# Patient Record
Sex: FEMALE | URBAN - METROPOLITAN AREA
[De-identification: names, ages, dates, MRNs, and addresses within clinical notes are randomized per-mention and may not be internally consistent; named-entity substitution may affect disease eponyms.]

---

## 2017-06-01 ENCOUNTER — OFFICE VISIT (OUTPATIENT)
Dept: OBGYN | Facility: CLINIC | Age: 42
End: 2017-06-01
Payer: COMMERCIAL

## 2017-06-01 ENCOUNTER — RADIANT APPOINTMENT (OUTPATIENT)
Dept: MAMMOGRAPHY | Facility: CLINIC | Age: 42
End: 2017-06-01
Payer: COMMERCIAL

## 2017-06-01 VITALS
HEIGHT: 66 IN | SYSTOLIC BLOOD PRESSURE: 112 MMHG | DIASTOLIC BLOOD PRESSURE: 84 MMHG | BODY MASS INDEX: 32.3 KG/M2 | WEIGHT: 201 LBS

## 2017-06-01 DIAGNOSIS — Z12.31 VISIT FOR SCREENING MAMMOGRAM: ICD-10-CM

## 2017-06-01 DIAGNOSIS — Z01.419 ENCOUNTER FOR GYNECOLOGICAL EXAMINATION WITHOUT ABNORMAL FINDING: Primary | ICD-10-CM

## 2017-06-01 DIAGNOSIS — Z30.41 ENCOUNTER FOR SURVEILLANCE OF CONTRACEPTIVE PILLS: ICD-10-CM

## 2017-06-01 DIAGNOSIS — E03.9 ACQUIRED HYPOTHYROIDISM: ICD-10-CM

## 2017-06-01 LAB — TSH SERPL DL<=0.005 MIU/L-ACNC: 3.96 MU/L (ref 0.4–4)

## 2017-06-01 PROCEDURE — G0202 SCR MAMMO BI INCL CAD: HCPCS | Mod: TC

## 2017-06-01 PROCEDURE — 84443 ASSAY THYROID STIM HORMONE: CPT | Performed by: NURSE PRACTITIONER

## 2017-06-01 PROCEDURE — 99396 PREV VISIT EST AGE 40-64: CPT | Performed by: NURSE PRACTITIONER

## 2017-06-01 PROCEDURE — 36415 COLL VENOUS BLD VENIPUNCTURE: CPT | Performed by: NURSE PRACTITIONER

## 2017-06-01 PROCEDURE — G0145 SCR C/V CYTO,THINLAYER,RESCR: HCPCS | Performed by: NURSE PRACTITIONER

## 2017-06-01 RX ORDER — LEVONORGESTREL AND ETHINYL ESTRADIOL 0.15-0.03
1 KIT ORAL DAILY
Qty: 112 TABLET | Refills: 3 | Status: SHIPPED | OUTPATIENT
Start: 2017-06-01 | End: 2018-07-23

## 2017-06-01 ASSESSMENT — ANXIETY QUESTIONNAIRES
3. WORRYING TOO MUCH ABOUT DIFFERENT THINGS: NOT AT ALL
6. BECOMING EASILY ANNOYED OR IRRITABLE: NOT AT ALL
IF YOU CHECKED OFF ANY PROBLEMS ON THIS QUESTIONNAIRE, HOW DIFFICULT HAVE THESE PROBLEMS MADE IT FOR YOU TO DO YOUR WORK, TAKE CARE OF THINGS AT HOME, OR GET ALONG WITH OTHER PEOPLE: NOT DIFFICULT AT ALL
2. NOT BEING ABLE TO STOP OR CONTROL WORRYING: NOT AT ALL
1. FEELING NERVOUS, ANXIOUS, OR ON EDGE: NOT AT ALL
GAD7 TOTAL SCORE: 0
7. FEELING AFRAID AS IF SOMETHING AWFUL MIGHT HAPPEN: NOT AT ALL
5. BEING SO RESTLESS THAT IT IS HARD TO SIT STILL: NOT AT ALL

## 2017-06-01 ASSESSMENT — PATIENT HEALTH QUESTIONNAIRE - PHQ9: 5. POOR APPETITE OR OVEREATING: NOT AT ALL

## 2017-06-01 NOTE — PROGRESS NOTES
Marily is a 41 year old  female who presents for annual exam.     Besides routine health maintenance,  she would like to discuss bladder issues.    HPI:  The patient has no PCP on file. Patient is here today for her annual exam. She has some urine loss when riding her horses. She recently started [employer] and is down 5lbs. She is happy with this and seems to be loosing more inches at this time. She is also working on her diet. She is still on OCP's continuously, no spotting. She has her mammogram today.      GYNECOLOGIC HISTORY:    Patient's last menstrual period was 2017 (approximate).  Her current contraception method is: oral contraceptives.  She  reports that she quit smoking about 6 years ago. She has never used smokeless tobacco.    Patient is not sexually active.  STD testing offered?  Declined  Last PHQ-9 score on record =   PHQ-9 SCORE 2017   Total Score 5     Last GAD7 score on record =   TORRES-7 SCORE 2017   Total Score 0     Alcohol Score = 1    HEALTH MAINTENANCE:  Cholesterol: 16  Cholesterol   Date Value Ref Range Status   2016 223 (H) <200 mg/dL Final     Comment:     Desirable:       <200 mg/dl     Last Mammo: 14, Result: normal, Next Mammo: today   Pap: 16  Lab Results   Component Value Date    PAP NIL 2016    PAP NIL 2015    PAP OTHER-NIL, See Result 2015     Colonoscopy:  N/A, Result: not applicable, Next Colonoscopy: N/A  Dexa:  N/A    Health maintenance updated:  yes    HISTORY:  Obstetric History       T1      L1     SAB0   TAB0   Ectopic0   Multiple0   Live Births0       # Outcome Date GA Lbr Matthew/2nd Weight Sex Delivery Anes PTL Lv   3 AB 99           2 Term 10/01/97    F    DANK   1 AB 95                  Patient Active Problem List   Diagnosis     Hypothyroidism     Past Surgical History:   Procedure Laterality Date     ANKLE SURGERY Right ,       Social History   Substance Use Topics      "Smoking status: Former Smoker     Quit date: 5/21/2011     Smokeless tobacco: Never Used     Alcohol use 0.0 oz/week     0 Standard drinks or equivalent per week      Comment: occ      Problem (# of Occurrences) Relation (Name,Age of Onset)    Breast Cancer (1) Paternal Grandmother    Thyroid Disease (3) Mother, Father, Sister            Current Outpatient Prescriptions   Medication Sig     levonorgestrel-ethinyl estradiol (SEASONALE) 0.15-0.03 MG per tablet Take 1 tablet by mouth daily     levothyroxine (SYNTHROID, LEVOTHROID) 112 MCG tablet Take 1 tablet (112 mcg) by mouth daily     buPROPion (WELLBUTRIN XL) 150 MG 24 hr tablet Take 1 tablet (150 mg) by mouth every morning     [DISCONTINUED] levonorgestrel-ethinyl estradiol (SEASONALE) 0.15-0.03 MG per tablet Take 1 tablet by mouth daily     No current facility-administered medications for this visit.      Allergies   Allergen Reactions     Sulfa Drugs Rash       Past medical, surgical, social and family histories were reviewed and updated in EPIC.    ROS:   12 point review of systems negative other than symptoms noted below.  Psychiatric: Difficulty Sleeping    EXAM:  /84  Ht 5' 6\" (1.676 m)  Wt 201 lb (91.2 kg)  LMP 05/24/2017 (Approximate)  Breastfeeding? No  BMI 32.44 kg/m2   BMI: Body mass index is 32.44 kg/(m^2).    PHYSICAL EXAM:  Constitutional:  Appearance: Well nourished, well developed, alert, in no acute distress  Neck:  Lymph Nodes:  No lymphadenopathy present    Thyroid:  Gland size normal, nontender, no nodules or masses present  on palpation  Chest:  Respiratory Effort:  Breathing unlabored  Cardiovascular:    Heart: Auscultation:  Regular rate, normal rhythm, no murmurs present  Breasts: Inspection of Breasts:  No lymphadenopathy present    Palpation of Breasts and Axillae:  No masses present on palpation, no  breast tenderness    Axillary Lymph Nodes:  No lymphadenopathy present  Gastrointestinal:   Abdominal Examination:  Abdomen " nontender to palpation, tone normal without rigidity or guarding, no masses present, umbilicus without lesions   Liver and Spleen:  No hepatomegaly present, liver nontender to palpation    Hernias:  No hernias present  Lymphatic: Lymph Nodes:  No other lymphadenopathy present  Skin:  General Inspection:  No rashes present, no lesions present, no areas of  discoloration    Genitalia and Groin:  No rashes present, no lesions present, no areas of  discoloration, no masses present  Neurologic/Psychiatric:    Mental Status:  Oriented X3     Pelvic Exam:  External Genitalia:     Normal appearance for age, no discharge present, no tenderness present, no inflammatory lesions present, color normal  Vagina:     Normal vaginal vault without central or paravaginal defects, no discharge present, no inflammatory lesions present, no masses present  Bladder:     Nontender to palpation  Urethra:   Urethral Body:  Urethra palpation normal, urethra structural support normal   Urethral Meatus:  No erythema or lesions present  Cervix:     Appearance healthy, no lesions present, nontender to palpation, no bleeding present  Uterus:     Uterus: firm, normal sized and nontender, anteverted in position.   Adnexa:     No adnexal tenderness present, no adnexal masses present  Perineum:     Perineum within normal limits, no evidence of trauma, no rashes or skin lesions present  Anus:     Anus within normal limits, no hemorrhoids present  Inguinal Lymph Nodes:     No lymphadenopathy present  Pubic Hair:     Normal pubic hair distribution for age  Genitalia and Groin:     No rashes present, no lesions present, no areas of discoloration, no masses present    COUNSELING:   Special attention given to:        Regular exercise       Healthy diet/nutrition       Contraception    BMI: Body mass index is 32.44 kg/(m^2).  Weight management plan: Discussed healthy diet and exercise guidelines and patient will follow up in 12 months in clinic to  re-evaluate.    ASSESSMENT:  41 year old female with satisfactory annual exam.    ICD-10-CM    1. Encounter for gynecological examination without abnormal finding Z01.419 Pap imaged thin layer screen reflex to HPV if ASCUS - recommended age 25 - 29 years   2. Encounter for surveillance of contraceptive pills Z30.41 levonorgestrel-ethinyl estradiol (SEASONALE) 0.15-0.03 MG per tablet   3. Acquired hypothyroidism E03.9 TSH with free T4 reflex       PLAN:  Annual pap, ok for OCP x1 year, will check thyroid today again. Encouraged weight loss for bladder, kegals.    YEYO Palm CNP

## 2017-06-01 NOTE — LETTER
June 7, 2017      Marily Beck  [address] [address]    Dear ,      I am happy to inform you that your recent cervical cancer screening test (PAP smear) was normal.      Preventative screenings such as this help to ensure your health for years to come. You should repeat a pap smear in 1 year, unless otherwise directed.      You will still need to return to the clinic every year for your annual exam and other preventive tests.     Please contact the clinic at [phone] if you have further questions.       Sincerely,      Svetlana Cochran, APRN CNP/rlm

## 2017-06-01 NOTE — MR AVS SNAPSHOT
"              After Visit Summary   6/1/2017    Marily Beck    MRN: [id number]           Patient Information     Date Of Birth          1975        Visit Information        Provider Department      6/1/2017 11:00 AM Svetlana Cochran APRN CNP [facility]        Today's Diagnoses     Encounter for gynecological examination without abnormal finding    -  1    Encounter for surveillance of contraceptive pills        Acquired hypothyroidism           Follow-ups after your visit        Follow-up notes from your care team     Return in about 1 year (around 6/1/2018).      Who to contact     If you have questions or need follow up information about today's clinic visit or your schedule please contact [facility] directly at [phone].  Normal or non-critical lab and imaging results will be communicated to you by UXFLIPhart, letter or phone within 4 business days after the clinic has received the results. If you do not hear from us within 7 days, please contact the clinic through UXFLIPhart or phone. If you have a critical or abnormal lab result, we will notify you by phone as soon as possible.  Submit refill requests through Deemelo or call your pharmacy and they will forward the refill request to us. Please allow 3 business days for your refill to be completed.          Additional Information About Your Visit        UXFLIPhart Information     Deemelo lets you send messages to your doctor, view your test results, renew your prescriptions, schedule appointments and more. To sign up, go to [email or web address] . Click on \"Log in\" on the left side of the screen, which will take you to the Welcome page. Then click on \"Sign up Now\" on the right side of the page.     You will be asked to enter the access code listed below, as well as some personal information. Please follow the directions to create your username and password.     Your access code is: [email or web address]  Expires: " "2017 11:19 AM     Your access code will  in 90 days. If you need help or a new code, please call your [facility] clinic or [phone].        Care EveryWhere ID     This is your Care EveryWhere ID. This could be used by other organizations to access your [facility] medical records  [id number]        Your Vitals Were     Height Last Period Breastfeeding? BMI (Body Mass Index)          5' 6\" (1.676 m) 2017 (Approximate) No 32.44 kg/m2         Blood Pressure from Last 3 Encounters:   17 112/84   16 110/70   06/01/15 137/89    Weight from Last 3 Encounters:   17 201 lb (91.2 kg)   16 206 lb (93.4 kg)   06/01/15 205 lb (93 kg)              We Performed the Following     Pap imaged thin layer screen reflex to HPV if ASCUS - recommended age 25 - 29 years     TSH with free T4 reflex          Where to get your medicines      These medications were sent to [place] [address] [place] [address] [address]     Phone:  [phone]     levonorgestrel-ethinyl estradiol 0.15-0.03 MG per tablet          Primary Care Provider    None Specified       No primary provider on file.        Thank you!     Thank you for choosing [facility]  for your care. Our goal is always to provide you with excellent care. Hearing back from our patients is one way we can continue to improve our services. Please take a few minutes to complete the written survey that you may receive in the mail after your visit with us. Thank you!             Your Updated Medication List - Protect others around you: Learn how to safely use, store and throw away your medicines at www.disposemymeds.org.          This list is accurate as of: 17 12:21 PM.  Always use your most recent med list.                   Brand Name Dispense Instructions for use    buPROPion 150 MG 24 hr tablet    WELLBUTRIN XL    90 tablet    Take 1 tablet (150 mg) by mouth every morning       " levonorgestrel-ethinyl estradiol 0.15-0.03 MG per tablet    SEASONALE    112 tablet    Take 1 tablet by mouth daily       levothyroxine 112 MCG tablet    SYNTHROID/LEVOTHROID    90 tablet    Take 1 tablet (112 mcg) by mouth daily

## 2017-06-01 NOTE — LETTER
[facility]  [address] , [address]  [place]   [address]  [phone]        6/6/2017     Marily Beck  [address] [address]        Marily Beck your lab results came back normal.       Results for orders placed or performed in visit on 06/01/17   TSH with free T4 reflex   Result Value Ref Range    TSH 3.96 0.40 - 4.00 mU/L       Your thyroid test was normal. You can continue your current dose of levothyroxine.    Cordially,    YEYO Palm CNP

## 2017-06-02 ASSESSMENT — PATIENT HEALTH QUESTIONNAIRE - PHQ9: SUM OF ALL RESPONSES TO PHQ QUESTIONS 1-9: 5

## 2017-06-02 ASSESSMENT — ANXIETY QUESTIONNAIRES: GAD7 TOTAL SCORE: 0

## 2017-06-05 LAB
COPATH REPORT: NORMAL
PAP: NORMAL

## 2017-06-06 RX ORDER — LEVOTHYROXINE SODIUM 112 UG/1
112 TABLET ORAL DAILY
Qty: 90 TABLET | Refills: 3 | Status: SHIPPED | OUTPATIENT
Start: 2017-06-06 | End: 2018-07-16

## 2017-10-12 DIAGNOSIS — F32.A DEPRESSION, UNSPECIFIED DEPRESSION TYPE: ICD-10-CM

## 2017-10-12 RX ORDER — BUPROPION HYDROCHLORIDE 150 MG/1
150 TABLET ORAL EVERY MORNING
Qty: 30 TABLET | Refills: 0 | Status: SHIPPED | OUTPATIENT
Start: 2017-10-12 | End: 2017-12-19

## 2017-10-12 NOTE — TELEPHONE ENCOUNTER
Pending Prescriptions:                       Disp   Refills    buPROPion (WELLBUTRIN XL) 150 MG 24 hr ta*90 tab*3            Sig: Take 1 tablet (150 mg) by mouth every morning         Last Written Prescription Date: 9/20/16  Last Fill Quantity: 90; # refills: 3  Last Office Visit with G, P or [facility] prescribing provider:  6/1/17  Future Visit: None        Last PHQ-9 score on record=   PHQ-9 SCORE 6/1/2017   Total Score 5       No results found for: AST  No results found for: ALT

## 2017-12-19 DIAGNOSIS — F32.A DEPRESSION, UNSPECIFIED DEPRESSION TYPE: ICD-10-CM

## 2017-12-19 NOTE — TELEPHONE ENCOUNTER
Bupropion HCL       Last Written Prescription Date:  10/12/17  Last Fill Quantity: 30,   # refills: 0  Last Office Visit: 6/1/17  Future Office visit:   NONE    Routing refill request to provider for review/approval because:  Rx not filled at last annnual. Routing to Svetlana perez for refill?

## 2017-12-20 RX ORDER — BUPROPION HYDROCHLORIDE 150 MG/1
150 TABLET ORAL EVERY MORNING
Qty: 90 TABLET | Refills: 1 | Status: SHIPPED | OUTPATIENT
Start: 2017-12-20

## 2018-07-16 DIAGNOSIS — E03.9 ACQUIRED HYPOTHYROIDISM: ICD-10-CM

## 2018-07-16 NOTE — TELEPHONE ENCOUNTER
"Requested Prescriptions   Pending Prescriptions Disp Refills     levothyroxine (SYNTHROID/LEVOTHROID) 112 MCG tablet 90 tablet 3     Sig: Take 1 tablet (112 mcg) by mouth daily    Thyroid Protocol Failed    7/16/2018  1:28 PM       Failed - Recent (12 mo) or future (30 days) visit within the authorizing provider's specialty    Patient had office visit in the last 12 months or has a visit in the next 30 days with authorizing provider or within the authorizing provider's specialty.  See \"Patient Info\" tab in inbasket, or \"Choose Columns\" in Meds & Orders section of the refill encounter.           Failed - Normal TSH on file in past 12 months    Recent Labs   Lab Test  06/01/17   1111   TSH  3.96             Passed - Patient is 12 years or older       Passed - No active pregnancy on record    If patient is pregnant or has had a positive pregnancy test, please check TSH.         Passed - No positive pregnancy test in past 12 months    If patient is pregnant or has had a positive pregnancy test, please check TSH.          Last Written Prescription Date:  6/6/17  Last Fill Quantity: 90,  # refills: 3   Last office visit: 6/1/2017 with prescribing provider:  DOMINIQUE   Future Office Visit:      "

## 2018-07-18 NOTE — TELEPHONE ENCOUNTER
Unable to reach patient or leave message as no options available.   Will attempt at later time.     Patsy Perez RN

## 2018-07-23 DIAGNOSIS — Z30.41 ENCOUNTER FOR SURVEILLANCE OF CONTRACEPTIVE PILLS: ICD-10-CM

## 2018-07-23 NOTE — TELEPHONE ENCOUNTER
Last Written Prescription Date:  6/1/17  Last Fill Quantity: 112,  # refills: 3   Last office visit: 6/1/2017 with prescribing provider:  Svetlana Cochran   Future Office Visit:  none

## 2018-07-25 RX ORDER — LEVONORGESTREL AND ETHINYL ESTRADIOL 0.15-0.03
1 KIT ORAL DAILY
Qty: 28 TABLET | Refills: 0 | Status: SHIPPED | OUTPATIENT
Start: 2018-07-25

## 2018-07-26 RX ORDER — LEVOTHYROXINE SODIUM 112 UG/1
112 TABLET ORAL DAILY
Qty: 30 TABLET | Refills: 0 | Status: SHIPPED | OUTPATIENT
Start: 2018-07-26 | End: 2018-08-06

## 2018-10-03 ENCOUNTER — RADIANT APPOINTMENT (OUTPATIENT)
Dept: GENERAL RADIOLOGY | Facility: CLINIC | Age: 43
End: 2018-10-03
Attending: NURSE PRACTITIONER
Payer: COMMERCIAL

## 2018-10-03 ENCOUNTER — OFFICE VISIT (OUTPATIENT)
Dept: URGENT CARE | Facility: URGENT CARE | Age: 43
End: 2018-10-03
Payer: COMMERCIAL

## 2018-10-03 VITALS
TEMPERATURE: 98.1 F | BODY MASS INDEX: 34.9 KG/M2 | SYSTOLIC BLOOD PRESSURE: 129 MMHG | OXYGEN SATURATION: 96 % | HEART RATE: 97 BPM | WEIGHT: 216.2 LBS | DIASTOLIC BLOOD PRESSURE: 87 MMHG

## 2018-10-03 DIAGNOSIS — S99.921A FOOT INJURY, RIGHT, INITIAL ENCOUNTER: Primary | ICD-10-CM

## 2018-10-03 DIAGNOSIS — S90.31XA CONTUSION OF RIGHT FOOT, INITIAL ENCOUNTER: ICD-10-CM

## 2018-10-03 PROCEDURE — 73630 X-RAY EXAM OF FOOT: CPT | Mod: RT

## 2018-10-03 PROCEDURE — 99213 OFFICE O/P EST LOW 20 MIN: CPT | Performed by: NURSE PRACTITIONER

## 2018-10-03 RX ORDER — IBUPROFEN 600 MG/1
600 TABLET, FILM COATED ORAL EVERY 6 HOURS PRN
Qty: 30 TABLET | Refills: 0 | Status: SHIPPED | OUTPATIENT
Start: 2018-10-03

## 2018-10-03 ASSESSMENT — ENCOUNTER SYMPTOMS
CHILLS: 0
HEADACHES: 0
SORE THROAT: 0
RHINORRHEA: 0
NAUSEA: 0
COUGH: 0
FEVER: 0
DIARRHEA: 0
VOMITING: 0
SHORTNESS OF BREATH: 0

## 2018-10-03 NOTE — PROGRESS NOTES
SUBJECTIVE:   Marily Beck is a 43 year old female presenting with a chief complaint of   Chief Complaint   Patient presents with     Foot Injury     Patient injured right foot        She is an established patient of [facility].    MS Injury/Pain    Onset of symptoms was 2 day(s) ago.  Location: right foot  Context:       The injury happened while at home      Mechanism: stepped on foot by a horse      Patient experienced immediate pain, immediate swelling, was able to bear weight directly after injury  Course of symptoms is worsening.    Severity moderate  Current and Associated symptoms: Pain, Swelling and Decreased range of motion  Denies  Redness and Stiffness  Aggravating Factors: walking, weight-bearing, repetitive motion and flexion/extension  Therapies to improve symptoms include: ice  This is the first time this type of problem has occurred for this patient.       Review of Systems   Constitutional: Negative for chills and fever.   HENT: Negative for congestion, ear pain, rhinorrhea and sore throat.    Respiratory: Negative for cough and shortness of breath.    Gastrointestinal: Negative for diarrhea, nausea and vomiting.   Musculoskeletal:        Right foot injury   Neurological: Negative for headaches.   All other systems reviewed and are negative.      Past Medical History:   Diagnosis Date     ASCUS (atypical squamous cells of undetermined significance) on gynecologic Papanicolaou smear complicating pregnancy, antepartum 2001     Atypical glandular cells of undetermined significance (ELTON) on cervical Pap smear 2002     Hypothyroid      Mixed urge and stress incontinence 2013     Family History   Problem Relation Age of Onset     Thyroid Disease Mother      Thyroid Disease Father      Breast Cancer Paternal Grandmother      Thyroid Disease Sister      Current Outpatient Prescriptions   Medication Sig Dispense Refill     buPROPion (WELLBUTRIN XL) 150 MG 24 hr tablet Take 1 tablet (150 mg) by mouth  every morning 90 tablet 1     ibuprofen (ADVIL/MOTRIN) 600 MG tablet Take 1 tablet (600 mg) by mouth every 6 hours as needed for moderate pain 30 tablet 0     levonorgestrel-ethinyl estradiol (SEASONALE) 0.15-0.03 MG per tablet Take 1 tablet by mouth daily 28 tablet 0     levothyroxine (SYNTHROID/LEVOTHROID) 112 MCG tablet TAKE 1 TABLET BY MOUTH EVERY DAY 10 tablet 0     Social History   Substance Use Topics     Smoking status: Former Smoker     Quit date: 5/21/2011     Smokeless tobacco: Never Used     Alcohol use 0.0 oz/week     0 Standard drinks or equivalent per week      Comment: occ       OBJECTIVE  /87 (BP Location: Left arm, Patient Position: Chair, Cuff Size: Adult Large)  Pulse 97  Temp 98.1  F (36.7  C) (Oral)  Wt 216 lb 3.2 oz (98.1 kg)  SpO2 96%  BMI 34.9 kg/m2    Physical Exam   Cardiovascular: Normal rate and normal heart sounds.    Pulmonary/Chest: Effort normal and breath sounds normal.   Musculoskeletal:   Bruising on top of right foot, mild swelling, tenderness on flexion and extension of foot. Pulses and sensation intact   Neurological: She is alert.   Psychiatric: She has a normal mood and affect. Her behavior is normal. Judgment and thought content normal.         ASSESSMENT:      ICD-10-CM    1. Foot injury, right, initial encounter S99.921A XR Foot Right G/E 3 Views     ibuprofen (ADVIL/MOTRIN) 600 MG tablet   2. Contusion of right foot, initial encounter S90.31XA           PLAN:  Rest painful area  ICE  Elevated  Pain medication as advised  Side effects of medication discussed  As pain subsides, stretching is advised  Consider physical therapy if pain is persisting after symptomatic treatment  All questions answered and patient is in agreement with treatment plan      Patient Instructions     Foot Contusion  You have a contusion. This is also called a bruise. There is swelling and some bleeding under the skin, but no broken bones. This injury generally takes a few days to a few  weeks to heal.  During that time, the bruise will typically change in color from reddish, to purple-blue, to greenish-yellow, then to yellow-brown.  Home care    Elevate the foot to reduce pain and swelling. As much as possible, sit or lie down with the foot raised about the level of your heart. This is especially important during the first 48 hours.    Ice the foot to help reduce pain and swelling. Wrap a cold source (ice pack or ice cubes in a plastic bag) in a thin towel. Apply to the bruised area for 20 minutes every 1 to 2 hours the first day. Continue this 3 to 4 times a day until the pain and swelling goes away.    Unless another medicine was prescribed, you can take acetaminophen, ibuprofen, or naproxen to control pain. (If you have chronic liver or kidney disease or ever had a stomach ulcer or gastrointestinal bleeding, talk with your healthcare provider before using these medicines.)  Follow up  Follow up with your healthcare provider or our staff as advised. Call if you are not improving within 1 to 2 weeks.  When to seek medical advice   Call your healthcare provider right away if you have any of the following:    Increased pain or swelling    Foot or leg becomes cold, blue, numb or tingly    Signs of infection: Warmth, drainage, or increased redness or pain around the bruise    Inability to move the injured foot     Frequent bruising for unknown reasons  Date Last Reviewed: 2/1/2017 2000-2017 [employer]. [address] [place] [id number]. All rights reserved. This information is not intended as a substitute for professional medical care. Always follow your healthcare professional's instructions.

## 2018-10-03 NOTE — MR AVS SNAPSHOT
After Visit Summary   10/3/2018    Marily Beck    MRN: [id number]           Patient Information     Date Of Birth          1975        Visit Information        Provider Department      10/3/2018 12:25 PM Marley Espino NP [facility]        Today's Diagnoses     Foot injury, right, initial encounter    -  1    Contusion of right foot, initial encounter          Care Instructions      Foot Contusion  You have a contusion. This is also called a bruise. There is swelling and some bleeding under the skin, but no broken bones. This injury generally takes a few days to a few weeks to heal.  During that time, the bruise will typically change in color from reddish, to purple-blue, to greenish-yellow, then to yellow-brown.  Home care    Elevate the foot to reduce pain and swelling. As much as possible, sit or lie down with the foot raised about the level of your heart. This is especially important during the first 48 hours.    Ice the foot to help reduce pain and swelling. Wrap a cold source (ice pack or ice cubes in a plastic bag) in a thin towel. Apply to the bruised area for 20 minutes every 1 to 2 hours the first day. Continue this 3 to 4 times a day until the pain and swelling goes away.    Unless another medicine was prescribed, you can take acetaminophen, ibuprofen, or naproxen to control pain. (If you have chronic liver or kidney disease or ever had a stomach ulcer or gastrointestinal bleeding, talk with your healthcare provider before using these medicines.)  Follow up  Follow up with your healthcare provider or our staff as advised. Call if you are not improving within 1 to 2 weeks.  When to seek medical advice   Call your healthcare provider right away if you have any of the following:    Increased pain or swelling    Foot or leg becomes cold, blue, numb or tingly    Signs of infection: Warmth, drainage, or increased redness or pain around the bruise    Inability to move  "the injured foot     Frequent bruising for unknown reasons  Date Last Reviewed: 2017-2017 [employer]. [address] [place] [address], [place] [address]. All rights reserved. This information is not intended as a substitute for professional medical care. Always follow your healthcare professional's instructions.                Follow-ups after your visit        Who to contact     If you have questions or need follow up information about today's clinic visit or your schedule please contact [facility] directly at [phone].  Normal or non-critical lab and imaging results will be communicated to you by [employer], letter or phone within 4 business days after the clinic has received the results. If you do not hear from us within 7 days, please contact the clinic through [employer] or phone. If you have a critical or abnormal lab result, we will notify you by phone as soon as possible.  Submit refill requests through [employer] or call your pharmacy and they will forward the refill request to us. Please allow 3 business days for your refill to be completed.          Additional Information About Your Visit        [employer] Information     [employer] lets you send messages to your doctor, view your test results, renew your prescriptions, schedule appointments and more. To sign up, go to [email or web address] . Click on \"Log in\" on the left side of the screen, which will take you to the Welcome page. Then click on \"Sign up Now\" on the right side of the page.     You will be asked to enter the access code listed below, as well as some personal information. Please follow the directions to create your username and password.     Your access code is: [email or web address]  Expires: 2019  1:44 PM     Your access code will  in 90 days. If you need help or a new code, please call your [facility] or [phone].        Care EveryWhere ID     This is your Care EveryWhere ID. This could be used by " other organizations to access your [place] medical records  [id number]        Your Vitals Were     Pulse Temperature Pulse Oximetry BMI (Body Mass Index)          97 98.1  F (36.7  C) (Oral) 96% 34.9 kg/m2         Blood Pressure from Last 3 Encounters:   10/03/18 129/87   06/01/17 112/84   05/23/16 110/70    Weight from Last 3 Encounters:   10/03/18 216 lb 3.2 oz (98.1 kg)   06/01/17 201 lb (91.2 kg)   05/23/16 206 lb (93.4 kg)              We Performed the Following     XR Foot Right G/E 3 Views          Today's Medication Changes          These changes are accurate as of 10/3/18  1:44 PM.  If you have any questions, ask your nurse or doctor.               Start taking these medicines.        Dose/Directions    ibuprofen 600 MG tablet   Commonly known as:  ADVIL/MOTRIN   Used for:  Foot injury, right, initial encounter   Started by:  Marley Espino, GLO        Dose:  600 mg   Take 1 tablet (600 mg) by mouth every 6 hours as needed for moderate pain   Quantity:  30 tablet   Refills:  0            Where to get your medicines      These medications were sent to [place] [address] [place] [address] [address]     Phone:  [phone]     ibuprofen 600 MG tablet                Primary Care Provider    None Specified       No primary provider on file.        Equal Access to Services     CARLOTTA ROMANO AH: Glenny Christine, washarminda jethro, qaybta kaalsteph pinedo. So [facility] [phone].    ATENCIÓN: Si habla español, tiene a seth disposición servicios gratuitos de asistencia lingüística. Everardoame al [phone].    We comply with applicable federal civil rights laws and [place] laws. We do not discriminate on the basis of race, color, national origin, age, disability, sex, sexual orientation, or gender identity.            Thank you!     Thank you for choosing [facility]  for your care. Our goal is always to  provide you with excellent care. Hearing back from our patients is one way we can continue to improve our services. Please take a few minutes to complete the written survey that you may receive in the mail after your visit with us. Thank you!             Your Updated Medication List - Protect others around you: Learn how to safely use, store and throw away your medicines at www.disposemymeds.org.          This list is accurate as of 10/3/18  1:44 PM.  Always use your most recent med list.                   Brand Name Dispense Instructions for use Diagnosis    buPROPion 150 MG 24 hr tablet    WELLBUTRIN XL    90 tablet    Take 1 tablet (150 mg) by mouth every morning    Depression, unspecified depression type       ibuprofen 600 MG tablet    ADVIL/MOTRIN    30 tablet    Take 1 tablet (600 mg) by mouth every 6 hours as needed for moderate pain    Foot injury, right, initial encounter       levonorgestrel-ethinyl estradiol 0.15-0.03 MG per tablet    SEASONALE    28 tablet    Take 1 tablet by mouth daily    Encounter for surveillance of contraceptive pills       levothyroxine 112 MCG tablet    SYNTHROID/LEVOTHROID    10 tablet    TAKE 1 TABLET BY MOUTH EVERY DAY    Acquired hypothyroidism

## 2018-10-03 NOTE — PATIENT INSTRUCTIONS
Foot Contusion  You have a contusion. This is also called a bruise. There is swelling and some bleeding under the skin, but no broken bones. This injury generally takes a few days to a few weeks to heal.  During that time, the bruise will typically change in color from reddish, to purple-blue, to greenish-yellow, then to yellow-brown.  Home care    Elevate the foot to reduce pain and swelling. As much as possible, sit or lie down with the foot raised about the level of your heart. This is especially important during the first 48 hours.    Ice the foot to help reduce pain and swelling. Wrap a cold source (ice pack or ice cubes in a plastic bag) in a thin towel. Apply to the bruised area for 20 minutes every 1 to 2 hours the first day. Continue this 3 to 4 times a day until the pain and swelling goes away.    Unless another medicine was prescribed, you can take acetaminophen, ibuprofen, or naproxen to control pain. (If you have chronic liver or kidney disease or ever had a stomach ulcer or gastrointestinal bleeding, talk with your healthcare provider before using these medicines.)  Follow up  Follow up with your healthcare provider or our staff as advised. Call if you are not improving within 1 to 2 weeks.  When to seek medical advice   Call your healthcare provider right away if you have any of the following:    Increased pain or swelling    Foot or leg becomes cold, blue, numb or tingly    Signs of infection: Warmth, drainage, or increased redness or pain around the bruise    Inability to move the injured foot     Frequent bruising for unknown reasons  Date Last Reviewed: 2/1/2017 2000-2017 [employer]. [address] [place] [address], [place] [address]. All rights reserved. This information is not intended as a substitute for professional medical care. Always follow your healthcare professional's instructions.

## 2018-10-28 DIAGNOSIS — Z30.41 ENCOUNTER FOR SURVEILLANCE OF CONTRACEPTIVE PILLS: ICD-10-CM

## 2018-10-29 NOTE — TELEPHONE ENCOUNTER
"Requested Prescriptions   Pending Prescriptions Disp Refills     levonorgestrel-ethinyl estradiol (SEASONALE) 0.15-0.03 MG per tablet [Pharmacy Med Name: LEVONOR-ETH ESTRAD 0.15-0.03] 91 tablet 0     Sig: TAKE 1 TABLET BY MOUTH EVERY DAY    Contraceptives Protocol Failed    10/28/2018 12:26 PM       Failed - Recent (12 mo) or future (30 days) visit within the authorizing provider's specialty    Patient had office visit in the last 12 months or has a visit in the next 30 days with authorizing provider or within the authorizing provider's specialty.  See \"Patient Info\" tab in inbasket, or \"Choose Columns\" in Meds & Orders section of the refill encounter.             Passed - Patient is not a current smoker if age is 35 or older       Passed - No active pregnancy on record       Passed - No positive pregnancy test in past 12 months        Last Written Prescription Date:  7/25/18  Last Fill Quantity: 28,  # refills: 0   Last office visit: 6/1/2017 with prescribing provider:  Sammie   Future Office Visit:      "

## 2018-10-30 RX ORDER — LEVONORGESTREL AND ETHINYL ESTRADIOL 0.15-0.03
KIT ORAL
Qty: 91 TABLET | Refills: 0 | OUTPATIENT
Start: 2018-10-30

## 2018-10-30 NOTE — TELEPHONE ENCOUNTER
Denied RX. Overdue for annual exam. 1 month extension already given this summer.     No appt in epic

## 2018-12-28 DIAGNOSIS — F32.A DEPRESSION, UNSPECIFIED DEPRESSION TYPE: ICD-10-CM

## 2018-12-28 RX ORDER — BUPROPION HYDROCHLORIDE 150 MG/1
150 TABLET ORAL EVERY MORNING
Qty: 90 TABLET | Refills: 1 | OUTPATIENT
Start: 2018-12-28

## 2018-12-28 NOTE — TELEPHONE ENCOUNTER
"Requested Prescriptions   Pending Prescriptions Disp Refills     buPROPion (WELLBUTRIN XL) 150 MG 24 hr tablet 90 tablet 1     Sig: Take 1 tablet (150 mg) by mouth every morning    SSRIs Protocol Failed - 12/28/2018  2:11 PM       Failed - PHQ-9 score less than 5 in past 6 months    Please review last PHQ-9 score.          Failed - Recent (6 mo) or future (30 days) visit within the authorizing provider's specialty    Patient had office visit in the last 6 months or has a visit in the next 30 days with authorizing provider or within the authorizing provider's specialty.  See \"Patient Info\" tab in inbasket, or \"Choose Columns\" in Meds & Orders section of the refill encounter.           Passed - Medication is Bupropion    If the medication is Bupropion (Wellbutrin), and the patient is taking for smoking cessation; OK to refill.         Passed - Patient is age 18 or older       Passed - No active pregnancy on record       Passed - No positive pregnancy test in last 12 months        Last Written Prescription Date:  12/20/17  Last Fill Quantity: 90,  # refills: 1   Last office visit: 6/1/2017 with prescribing provider:  Sammie   Future Office Visit:      Denied pt needs to make an appointment for further refills.   Kendra Tamayo RN on 12/28/2018 at 3:01 PM      "

## 2019-03-29 ENCOUNTER — OFFICE VISIT (OUTPATIENT)
Dept: URGENT CARE | Facility: URGENT CARE | Age: 44
End: 2019-03-29
Payer: COMMERCIAL

## 2019-03-29 VITALS
BODY MASS INDEX: 34.63 KG/M2 | TEMPERATURE: 98.2 F | WEIGHT: 214.56 LBS | DIASTOLIC BLOOD PRESSURE: 89 MMHG | HEART RATE: 86 BPM | RESPIRATION RATE: 20 BRPM | SYSTOLIC BLOOD PRESSURE: 142 MMHG | OXYGEN SATURATION: 97 %

## 2019-03-29 DIAGNOSIS — H10.13 ALLERGIC CONJUNCTIVITIS, BILATERAL: Primary | ICD-10-CM

## 2019-03-29 PROCEDURE — 99213 OFFICE O/P EST LOW 20 MIN: CPT | Performed by: PHYSICIAN ASSISTANT

## 2019-03-29 RX ORDER — OLOPATADINE HYDROCHLORIDE 2 MG/ML
1 SOLUTION/ DROPS OPHTHALMIC DAILY
Qty: 1 BOTTLE | Refills: 0 | Status: SHIPPED | OUTPATIENT
Start: 2019-03-29

## 2019-03-29 RX ORDER — FLUTICASONE PROPIONATE 50 MCG
2 SPRAY, SUSPENSION (ML) NASAL DAILY
Qty: 15.8 ML | Refills: 0 | Status: SHIPPED | OUTPATIENT
Start: 2019-03-29

## 2019-03-29 ASSESSMENT — ENCOUNTER SYMPTOMS
CHILLS: 0
CARDIOVASCULAR NEGATIVE: 1
ARTHRALGIAS: 0
NAUSEA: 0
EYE ITCHING: 1
EYE REDNESS: 1
HEMATOLOGIC/LYMPHATIC NEGATIVE: 1
DIARRHEA: 0
NECK PAIN: 0
MYALGIAS: 0
BRUISES/BLEEDS EASILY: 0
RHINORRHEA: 0
ENDOCRINE NEGATIVE: 1
DIZZINESS: 0
VOMITING: 0
ALLERGIC/IMMUNOLOGIC NEGATIVE: 1
WEAKNESS: 0
WOUND: 0
HEADACHES: 0
FEVER: 0
COUGH: 0
RESPIRATORY NEGATIVE: 1
SHORTNESS OF BREATH: 0
LIGHT-HEADEDNESS: 0
JOINT SWELLING: 0
SORE THROAT: 0
PALPITATIONS: 0
MUSCULOSKELETAL NEGATIVE: 1
NECK STIFFNESS: 0
BACK PAIN: 0

## 2019-03-29 NOTE — PROGRESS NOTES
Chief Complaint:      Chief Complaint   Patient presents with     Eye Problem     Eyes burning and itching, painful off and on for the last two week, more painful today so came in, also light sensitivity       HPI: Marily Beck is a 43 year old female presenting with both eyes burning, redness, itching  for the past 14 days.  There has not been exposure to pink eye.  There has not been trauma to the eye.    Marily Beck does wear contact lenses.    No problems with vision, or eye pain.  She does have seasonal allergies and has had these symptoms in the past       Other associated symptoms: congestion, post nasal drip  for 14 days.    No fever, abdominal pain, diarrhea or vomiting.  No cough, chest pain or shortness of breath.    ROS:     Review of Systems   Constitutional: Negative for chills and fever.   HENT: Negative for congestion, ear pain, rhinorrhea and sore throat.    Eyes: Positive for redness and itching.   Respiratory: Negative.  Negative for cough and shortness of breath.    Cardiovascular: Negative.  Negative for chest pain and palpitations.   Gastrointestinal: Negative for diarrhea, nausea and vomiting.   Endocrine: Negative.    Genitourinary: Negative.    Musculoskeletal: Negative.  Negative for arthralgias, back pain, joint swelling, myalgias, neck pain and neck stiffness.   Skin: Negative.  Negative for rash and wound.   Allergic/Immunologic: Negative.  Negative for immunocompromised state.   Neurological: Negative for dizziness, weakness, light-headedness and headaches.   Hematological: Negative.  Does not bruise/bleed easily.           Family History   Family History   Problem Relation Age of Onset     Thyroid Disease Mother      Thyroid Disease Father      Breast Cancer Paternal Grandmother      Thyroid Disease Sister         Problem history  Patient Active Problem List   Diagnosis     Hypothyroidism        Allergies  Allergies   Allergen Reactions     Sulfa Drugs Rash      Sulfasalazine Rash        Social History  Social History     Socioeconomic History     Marital status:      Spouse name: Not on file     Number of children: Not on file     Years of education: Not on file     Highest education level: Not on file   Occupational History     Not on file   Social Needs     Financial resource strain: Not on file     Food insecurity:     Worry: Not on file     Inability: Not on file     Transportation needs:     Medical: Not on file     Non-medical: Not on file   Tobacco Use     Smoking status: Former Smoker     Last attempt to quit: 2011     Years since quittin.8     Smokeless tobacco: Never Used   Substance and Sexual Activity     Alcohol use: Yes     Alcohol/week: 0.0 oz     Comment: occ     Drug use: No     Sexual activity: No     Partners: Male     Birth control/protection: Pill   Lifestyle     Physical activity:     Days per week: Not on file     Minutes per session: Not on file     Stress: Not on file   Relationships     Social connections:     Talks on phone: Not on file     Gets together: Not on file     Attends Tenriism service: Not on file     Active member of club or organization: Not on file     Attends meetings of clubs or organizations: Not on file     Relationship status: Not on file     Intimate partner violence:     Fear of current or ex partner: Not on file     Emotionally abused: Not on file     Physically abused: Not on file     Forced sexual activity: Not on file   Other Topics Concern     Parent/sibling w/ CABG, MI or angioplasty before 65F 55M? Not Asked   Social History Narrative     Not on file        Current Meds    Current Outpatient Medications:      buPROPion (WELLBUTRIN XL) 150 MG 24 hr tablet, Take 1 tablet (150 mg) by mouth every morning, Disp: 90 tablet, Rfl: 1     fluticasone (FLONASE) 50 MCG/ACT nasal spray, Spray 2 sprays into both nostrils daily, Disp: 15.8 mL, Rfl: 0     levonorgestrel-ethinyl estradiol (SEASONALE) 0.15-0.03 MG per  tablet, Take 1 tablet by mouth daily, Disp: 28 tablet, Rfl: 0     levothyroxine (SYNTHROID/LEVOTHROID) 112 MCG tablet, TAKE 1 TABLET BY MOUTH EVERY DAY, Disp: 10 tablet, Rfl: 0     olopatadine (PATADAY) 0.2 % ophthalmic solution, Place 1 drop into both eyes daily, Disp: 1 Bottle, Rfl: 0     ibuprofen (ADVIL/MOTRIN) 600 MG tablet, Take 1 tablet (600 mg) by mouth every 6 hours as needed for moderate pain (Patient not taking: Reported on 3/29/2019), Disp: 30 tablet, Rfl: 0          OBJECTIVE     Vital signs reviewed by Evens Hensley  /89 (BP Location: Left arm, Patient Position: Chair, Cuff Size: Adult Regular)   Pulse 86   Temp 98.2  F (36.8  C) (Oral)   Resp 20   Wt 97.3 kg (214 lb 9 oz)   SpO2 97%   Breastfeeding? No   BMI 34.63 kg/m       Physical Exam   Constitutional: She is oriented to person, place, and time. She appears well-developed and well-nourished. She is cooperative.  Non-toxic appearance. She does not have a sickly appearance. She does not appear ill. No distress.   HENT:   Head: Normocephalic and atraumatic.   Right Ear: Tympanic membrane and external ear normal. Tympanic membrane is not perforated, not erythematous, not retracted and not bulging.   Left Ear: Tympanic membrane and external ear normal. Tympanic membrane is not perforated, not erythematous, not retracted and not bulging.   Nose: No mucosal edema or rhinorrhea.   Mouth/Throat: Oropharynx is clear and moist. No oropharyngeal exudate, posterior oropharyngeal edema, posterior oropharyngeal erythema or tonsillar abscesses.   Eyes: EOM and lids are normal. Pupils are equal, round, and reactive to light. Right eye exhibits no chemosis, no discharge and no exudate. Left eye exhibits no chemosis, no discharge and no exudate. Right conjunctiva is not injected. Left conjunctiva is not injected.   Neck: Trachea normal, normal range of motion and full passive range of motion without pain. Neck supple.   Cardiovascular: Normal rate,  regular rhythm, S1 normal, S2 normal, normal heart sounds and intact distal pulses. Exam reveals no gallop and no friction rub.   No murmur heard.  Pulmonary/Chest: Effort normal and breath sounds normal. No respiratory distress. She has no decreased breath sounds. She has no wheezes. She has no rhonchi. She has no rales.   Abdominal: Soft. Bowel sounds are normal. She exhibits no distension and no mass. There is no hepatosplenomegaly. There is no tenderness. There is no rigidity, no rebound, no guarding and no CVA tenderness.   Lymphadenopathy:     She has no cervical adenopathy.   Neurological: She is alert and oriented to person, place, and time. She has normal reflexes. No cranial nerve deficit.   Skin: Skin is warm and dry. She is not diaphoretic.   Psychiatric: She has a normal mood and affect. Her behavior is normal. Judgment and thought content normal.   Nursing note and vitals reviewed.         Medical Decision Making:    Differential Diagnosis:  Eye Problem: Allergic conjunctivitis     ASSESSMENT     1. Allergic conjunctivitis, bilateral         PLAN  Rx for Pataday drops  Artifical tears for irritation  Warm compresses with baby shampoo for mattering.   If symptoms are not improving follow up with your eye doctor in 2-3 days.  See your eye doctor immediately if symptoms worsen.  Worrisome symptoms discussed with instructions to go to the ED.  Patient verbalized understanding and agreed with this plan.     Evens Hensley  3/29/2019, 6:15 PM

## 2019-03-29 NOTE — NURSING NOTE
Right Eye 20/30   Left Eye 20/30   Both Eyes 20/30    Patient wears contact lenses.    Frances Dior MA